# Patient Record
Sex: FEMALE | Race: WHITE
[De-identification: names, ages, dates, MRNs, and addresses within clinical notes are randomized per-mention and may not be internally consistent; named-entity substitution may affect disease eponyms.]

---

## 2019-08-11 ENCOUNTER — HOSPITAL ENCOUNTER (EMERGENCY)
Dept: HOSPITAL 94 - ER | Age: 69
LOS: 1 days | Discharge: HOME | End: 2019-08-12
Payer: MEDICARE

## 2019-08-11 VITALS — BODY MASS INDEX: 26.42 KG/M2 | HEIGHT: 64 IN | WEIGHT: 154.76 LBS

## 2019-08-11 DIAGNOSIS — I10: ICD-10-CM

## 2019-08-11 DIAGNOSIS — I38: ICD-10-CM

## 2019-08-11 DIAGNOSIS — Z56.0: ICD-10-CM

## 2019-08-11 DIAGNOSIS — K64.4: Primary | ICD-10-CM

## 2019-08-11 DIAGNOSIS — Z79.899: ICD-10-CM

## 2019-08-11 DIAGNOSIS — Z90.710: ICD-10-CM

## 2019-08-11 DIAGNOSIS — E87.6: ICD-10-CM

## 2019-08-11 LAB
BASOPHILS # BLD AUTO: 0.1 X10'3 (ref 0–0.2)
BASOPHILS NFR BLD AUTO: 0.9 % (ref 0–1)
EOSINOPHIL # BLD AUTO: 0.1 X10'3 (ref 0–0.9)
EOSINOPHIL NFR BLD AUTO: 1.2 % (ref 0–6)
ERYTHROCYTE [DISTWIDTH] IN BLOOD BY AUTOMATED COUNT: 13.1 % (ref 11.5–14.5)
HCT VFR BLD AUTO: 40.2 % (ref 35–45)
HGB BLD-MCNC: 13.7 G/DL (ref 12–16)
LYMPHOCYTES # BLD AUTO: 2.6 X10'3 (ref 1.1–4.8)
LYMPHOCYTES NFR BLD AUTO: 22.4 % (ref 21–51)
MCH RBC QN AUTO: 30.7 PG (ref 27–31)
MCHC RBC AUTO-ENTMCNC: 34.2 G/DL (ref 33–36.5)
MCV RBC AUTO: 89.7 FL (ref 78–98)
MONOCYTES # BLD AUTO: 0.8 X10'3 (ref 0–0.9)
MONOCYTES NFR BLD AUTO: 6.4 % (ref 2–12)
NEUTROPHILS # BLD AUTO: 8.1 X10'3 (ref 1.8–7.7)
NEUTROPHILS NFR BLD AUTO: 69.1 % (ref 42–75)
PLATELET # BLD AUTO: 239 X10'3 (ref 140–440)
PMV BLD AUTO: 7.5 FL (ref 7.4–10.4)
RBC # BLD AUTO: 4.48 X10'6 (ref 4.2–5.6)
WBC # BLD AUTO: 11.8 X10'3 (ref 4.5–11)

## 2019-08-11 PROCEDURE — 83690 ASSAY OF LIPASE: CPT

## 2019-08-11 PROCEDURE — 36415 COLL VENOUS BLD VENIPUNCTURE: CPT

## 2019-08-11 PROCEDURE — 74176 CT ABD & PELVIS W/O CONTRAST: CPT

## 2019-08-11 PROCEDURE — 80053 COMPREHEN METABOLIC PANEL: CPT

## 2019-08-11 PROCEDURE — 82272 OCCULT BLD FECES 1-3 TESTS: CPT

## 2019-08-11 PROCEDURE — 85025 COMPLETE CBC W/AUTO DIFF WBC: CPT

## 2019-08-11 PROCEDURE — 99284 EMERGENCY DEPT VISIT MOD MDM: CPT

## 2019-08-11 SDOH — ECONOMIC STABILITY - INCOME SECURITY: UNEMPLOYMENT, UNSPECIFIED: Z56.0

## 2019-08-11 NOTE — NUR
PT WITH STABLE VS. REPROTS PAIN TO RLQ ABD 6 OUT OF 10 AND INITERMITTENT. 
AWAITING TO GO TO CT SCAN. PIV IN PLACE AND LABS DRAWN.

## 2019-08-12 VITALS — DIASTOLIC BLOOD PRESSURE: 80 MMHG | SYSTOLIC BLOOD PRESSURE: 113 MMHG

## 2019-08-12 LAB
ALBUMIN SERPL BCP-MCNC: 3.3 G/DL (ref 3.4–5)
ALBUMIN/GLOB SERPL: 1.1 {RATIO} (ref 1.1–1.5)
ALP SERPL-CCNC: 64 IU/L (ref 46–116)
ALT SERPL W P-5'-P-CCNC: 33 U/L (ref 12–78)
ANION GAP SERPL CALCULATED.3IONS-SCNC: 11 MMOL/L (ref 8–16)
AST SERPL W P-5'-P-CCNC: 12 U/L (ref 10–37)
BILIRUB SERPL-MCNC: 0.2 MG/DL (ref 0.1–1)
BUN SERPL-MCNC: 8 MG/DL (ref 7–18)
BUN/CREAT SERPL: 10.8 (ref 6.6–38)
CALCIUM SERPL-MCNC: 8.5 MG/DL (ref 8.5–10.1)
CHLORIDE SERPL-SCNC: 105 MMOL/L (ref 99–107)
CO2 SERPL-SCNC: 29.3 MMOL/L (ref 24–32)
CREAT SERPL-MCNC: 0.74 MG/DL (ref 0.4–0.9)
GFR SERPL CREATININE-BSD FRML MDRD: 79 ML/MIN
GLUCOSE SERPL-MCNC: 106 MG/DL (ref 70–104)
HEMOCCULT STL QL: NEGATIVE
LIPASE SERPL-CCNC: 86 U/L (ref 73–393)
POTASSIUM SERPL-SCNC: 2.8 MMOL/L (ref 3.5–5.1)
PROT SERPL-MCNC: 6.3 G/DL (ref 6.4–8.2)
SODIUM SERPL-SCNC: 145 MMOL/L (ref 135–145)

## 2019-08-12 NOTE — NUR
dr. john aware the pt 's k 2.8. he reports pt to be discharged soon. she will 
received adtl meds before dc.

## 2023-04-24 ENCOUNTER — OFFICE VISIT (OUTPATIENT)
Dept: URGENT CARE | Facility: PHYSICIAN GROUP | Age: 73
End: 2023-04-24
Payer: MEDICARE

## 2023-04-24 VITALS
SYSTOLIC BLOOD PRESSURE: 112 MMHG | HEIGHT: 64 IN | HEART RATE: 80 BPM | TEMPERATURE: 98.4 F | BODY MASS INDEX: 23.73 KG/M2 | OXYGEN SATURATION: 98 % | DIASTOLIC BLOOD PRESSURE: 78 MMHG | WEIGHT: 139 LBS | RESPIRATION RATE: 14 BRPM

## 2023-04-24 DIAGNOSIS — B02.9 HERPES ZOSTER WITHOUT COMPLICATION: ICD-10-CM

## 2023-04-24 PROCEDURE — 99203 OFFICE O/P NEW LOW 30 MIN: CPT | Performed by: FAMILY MEDICINE

## 2023-04-24 RX ORDER — LOSARTAN POTASSIUM 50 MG/1
TABLET ORAL
COMMUNITY
Start: 2023-02-14

## 2023-04-24 RX ORDER — SIMVASTATIN 40 MG
TABLET ORAL
COMMUNITY
Start: 2023-02-14

## 2023-04-24 RX ORDER — AMLODIPINE BESYLATE 10 MG/1
10 TABLET ORAL DAILY
COMMUNITY

## 2023-04-24 RX ORDER — PAROXETINE HYDROCHLORIDE 20 MG/1
20 TABLET, FILM COATED ORAL EVERY MORNING
COMMUNITY

## 2023-04-24 RX ORDER — HYDROCHLOROTHIAZIDE 12.5 MG/1
12.5 TABLET ORAL DAILY
COMMUNITY

## 2023-04-24 RX ORDER — VALACYCLOVIR HYDROCHLORIDE 1 G/1
1000 TABLET, FILM COATED ORAL 3 TIMES DAILY
Qty: 21 TABLET | Refills: 0 | Status: SHIPPED | OUTPATIENT
Start: 2023-04-24 | End: 2023-05-01

## 2023-04-24 ASSESSMENT — ENCOUNTER SYMPTOMS
CHILLS: 0
PAIN: 1
FEVER: 0
COUGH: 0
DIZZINESS: 0
NAUSEA: 0
VOMITING: 0
SHORTNESS OF BREATH: 0
SORE THROAT: 0
MYALGIAS: 0

## 2023-04-24 NOTE — LETTER
April 24, 2023         Patient: Leny Cruz   YOB: 1950   Date of Visit: 4/24/2023           To Whom it May Concern:    Leny Cruz was seen in my clinic on 4/24/2023.  Excuse 4/25/2023, 4/26/2023, 4/27/2023.  If you have any questions or concerns, please don't hesitate to call.        Sincerely,           Fuentes Jimenez M.D.  Electronically Signed

## 2023-04-24 NOTE — PROGRESS NOTES
Subjective:   Leny Cruz is a 72 y.o. female who presents for Chest Pain (Pt states it feels like it burns around her (L) side x 3 days. Pt states the pain wakes her up at night. )        Pain  Chronicity: Reports left-sided back, axilla, and chest pain, onset 3 days prior, denies traumatic fall or injury, reports burning pain which is worse with pressure from close and sheets and prevents sleep at night. Episode onset: 3 days. The problem occurs constantly. The problem has been unchanged. Associated symptoms include a rash (Vesicular rash left-sided axilla and chest wall, describes burning pain with palpation). Pertinent negatives include no chills, coughing, fever, myalgias, nausea, sore throat or vomiting. Associated symptoms comments: Reports history of varicella chickenpox as a child, denies shingles vaccination. Exacerbated by: Direct pressure. She has tried rest for the symptoms. The treatment provided no relief.   PMH:  has no past medical history on file.  MEDS:   Current Outpatient Medications:     metformin (GLUCOPHAGE) 1000 MG tablet, , Disp: , Rfl:     amLODIPine (NORVASC) 10 MG Tab, Take 10 mg by mouth every day., Disp: , Rfl:     losartan (COZAAR) 50 MG Tab, , Disp: , Rfl:     hydroCHLOROthiazide (HYDRODIURIL) 12.5 MG tablet, Take 12.5 mg by mouth every day. (Patient not taking: Reported on 4/24/2023), Disp: , Rfl:     PARoxetine (PAXIL) 20 MG Tab, Take 20 mg by mouth every morning. (Patient not taking: Reported on 4/24/2023), Disp: , Rfl:     simvastatin (ZOCOR) 40 MG Tab, , Disp: , Rfl:   ALLERGIES: Not on File  SURGHX: No past surgical history on file.  SOCHX:    FH: No family history on file.  Review of Systems   Constitutional:  Negative for chills and fever.   HENT:  Negative for sore throat.    Respiratory:  Negative for cough and shortness of breath.    Gastrointestinal:  Negative for nausea and vomiting.   Musculoskeletal:  Negative for myalgias.   Skin:  Positive for rash (Vesicular rash  "left-sided axilla and chest wall, describes burning pain with palpation).   Neurological:  Negative for dizziness.      Objective:   /78   Pulse 80   Temp 36.9 °C (98.4 °F) (Temporal)   Resp 14   Ht 1.626 m (5' 4\")   Wt 63 kg (139 lb)   SpO2 98%   BMI 23.86 kg/m²   Physical Exam  Vitals and nursing note reviewed.   Constitutional:       General: She is not in acute distress.     Appearance: She is well-developed.   HENT:      Head: Normocephalic and atraumatic.      Right Ear: External ear normal.      Left Ear: External ear normal.      Nose: Nose normal.      Mouth/Throat:      Mouth: Mucous membranes are moist.   Eyes:      Conjunctiva/sclera: Conjunctivae normal.   Cardiovascular:      Rate and Rhythm: Normal rate.   Pulmonary:      Effort: Pulmonary effort is normal. No respiratory distress.      Breath sounds: Normal breath sounds. No wheezing or rhonchi.       Chest:       Abdominal:      General: There is no distension.   Musculoskeletal:         General: Normal range of motion.   Skin:     General: Skin is warm and dry.   Neurological:      General: No focal deficit present.      Mental Status: She is alert and oriented to person, place, and time. Mental status is at baseline.      Gait: Gait (gait at baseline) normal.   Psychiatric:         Judgment: Judgment normal.         Assessment/Plan:   1. Herpes zoster without complication    Other orders  - metformin (GLUCOPHAGE) 1000 MG tablet  - amLODIPine (NORVASC) 10 MG Tab; Take 10 mg by mouth every day.  - hydroCHLOROthiazide (HYDRODIURIL) 12.5 MG tablet; Take 12.5 mg by mouth every day. (Patient not taking: Reported on 4/24/2023)  - losartan (COZAAR) 50 MG Tab  - PARoxetine (PAXIL) 20 MG Tab; Take 20 mg by mouth every morning. (Patient not taking: Reported on 4/24/2023)  - simvastatin (ZOCOR) 40 MG Tab        Medical Decision Making/Course:  In the course of preparing for this visit with review of the pertinent past medical history, recent and " past clinic visits, current medications, and performing chart, immunization, medical history and medication reconciliation, and in the further course of obtaining the current history pertinent to the clinic visit today, performing an exam and evaluation, ordering and independently evaluating labs, tests  , and/or procedures, prescribing any recommended new medications as noted above, providing any pertinent counseling and education and recommending further coordination of care including recommendations for symptomatic and supportive measures and drafting a work excuse letter, at least  24 minutes of total time were spent during this encounter.      Discussed close monitoring, return precautions, and supportive measures of maintaining adequate fluid hydration and caloric intake, relative rest and symptom management as needed for pain and/or fever.    Differential diagnosis, natural history, supportive care, and indications for immediate follow-up discussed.     Advised the patient to follow-up with the primary care physician for recheck, reevaluation, and consideration of further management.    Please note that this dictation was created using voice recognition software. I have worked with consultants from the vendor as well as technical experts from [employer] [employer] to optimize the interface. I have made every reasonable attempt to correct obvious errors, but I expect that there are errors of grammar and possibly content that I did not discover before finalizing the note.

## 2024-03-19 ENCOUNTER — TELEPHONE (OUTPATIENT)
Dept: CARDIOLOGY | Facility: MEDICAL CENTER | Age: 74
End: 2024-03-19
Payer: COMMERCIAL

## 2024-03-20 NOTE — TELEPHONE ENCOUNTER
Called Dr Trevizo clinic with abnormal echo EF 20% at [facility]